# Patient Record
Sex: MALE | Race: WHITE | NOT HISPANIC OR LATINO | URBAN - METROPOLITAN AREA
[De-identification: names, ages, dates, MRNs, and addresses within clinical notes are randomized per-mention and may not be internally consistent; named-entity substitution may affect disease eponyms.]

---
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
## 2022-01-03 NOTE — PROCEDURE: ISOTRETINOIN MONITORING
Patient presented with acute respiratory failure with hypoxia secondary to bilateral COVID pneumonia   Initially requiring 15 L mid flow  Patient was placed on the COVID treatment protocol, and escalated to [facility] category of disease  Currently on HFNC 100% and 55 LPM with NRB at times  Wean O2 as tolerated  Respiratory protocol in place  Airway clearance protocol Xerosis Aggressive Treatment: I recommended application of Cetaphil or CeraVe numerous times a day going to bed to all dry areas. I also prescribed a topical steroid for twice daily use.
